# Patient Record
Sex: FEMALE | Race: WHITE | Employment: FULL TIME
[De-identification: names, ages, dates, MRNs, and addresses within clinical notes are randomized per-mention and may not be internally consistent; named-entity substitution may affect disease eponyms.]

---

## 2017-07-08 ENCOUNTER — HEALTH MAINTENANCE LETTER (OUTPATIENT)
Age: 42
End: 2017-07-08

## 2017-09-25 ENCOUNTER — TELEPHONE (OUTPATIENT)
Dept: OBGYN | Facility: CLINIC | Age: 42
End: 2017-09-25

## 2017-09-25 DIAGNOSIS — Z85.3 PERSONAL HISTORY OF MALIGNANT NEOPLASM OF BREAST: Primary | ICD-10-CM

## 2017-09-25 NOTE — TELEPHONE ENCOUNTER
Pt called back said she wants this to check to make sure she does not have any more polyps and she has a history of breast cancer, she said the MD would know why to order the US.    Please advise -    Gabby Dorado  [facility] [place]

## 2017-09-25 NOTE — TELEPHONE ENCOUNTER
Pt calling to get a pelvic ultra sound placed so when she schedules her annual in November she can also do the U/S.    Please advise -    Gabby Dorado  [facility] [place]

## 2017-09-25 NOTE — TELEPHONE ENCOUNTER
Please review and advise.  Do you want to order the pelvic ultrasound?    Thank you.    Krysta Gaxiola   Ob/Gyn Clinic  RN

## 2017-09-27 NOTE — TELEPHONE ENCOUNTER
ULTRASOUND of the pelvis  Please   Russell Tran     Order placed.  Please notify the patient.    Thank you.    Krysta Gaxiola   Ob/Gyn Clinic  [place]

## 2017-09-27 NOTE — TELEPHONE ENCOUNTER
Pt informed - she will schedule.    -Rosamaria MUELLER [facility]  Clinic Station

## 2017-11-01 ENCOUNTER — OFFICE VISIT (OUTPATIENT)
Dept: OBGYN | Facility: CLINIC | Age: 42
End: 2017-11-01
Payer: COMMERCIAL

## 2017-11-01 ENCOUNTER — HOSPITAL ENCOUNTER (OUTPATIENT)
Dept: ULTRASOUND IMAGING | Facility: CLINIC | Age: 42
Discharge: HOME | End: 2017-11-01
Attending: OBSTETRICS & GYNECOLOGY | Admitting: OBSTETRICS & GYNECOLOGY
Payer: COMMERCIAL

## 2017-11-01 VITALS
DIASTOLIC BLOOD PRESSURE: 83 MMHG | HEIGHT: 64 IN | SYSTOLIC BLOOD PRESSURE: 134 MMHG | WEIGHT: 178.4 LBS | HEART RATE: 69 BPM | BODY MASS INDEX: 30.46 KG/M2

## 2017-11-01 DIAGNOSIS — Z85.3 PERSONAL HISTORY OF MALIGNANT NEOPLASM OF BREAST: ICD-10-CM

## 2017-11-01 DIAGNOSIS — Z01.419 ENCOUNTER FOR GYNECOLOGICAL EXAMINATION WITHOUT ABNORMAL FINDING: Primary | ICD-10-CM

## 2017-11-01 DIAGNOSIS — C50.912 MALIGNANT NEOPLASM OF LEFT FEMALE BREAST, UNSPECIFIED ESTROGEN RECEPTOR STATUS, UNSPECIFIED SITE OF BREAST (H): ICD-10-CM

## 2017-11-01 PROCEDURE — 76830 TRANSVAGINAL US NON-OB: CPT

## 2017-11-01 PROCEDURE — G0145 SCR C/V CYTO,THINLAYER,RESCR: HCPCS | Performed by: OBSTETRICS & GYNECOLOGY

## 2017-11-01 PROCEDURE — 99396 PREV VISIT EST AGE 40-64: CPT | Performed by: OBSTETRICS & GYNECOLOGY

## 2017-11-01 PROCEDURE — 87624 HPV HI-RISK TYP POOLED RSLT: CPT | Performed by: OBSTETRICS & GYNECOLOGY

## 2017-11-01 PROCEDURE — 87491 CHLMYD TRACH DNA AMP PROBE: CPT | Performed by: OBSTETRICS & GYNECOLOGY

## 2017-11-01 PROCEDURE — 87591 N.GONORRHOEAE DNA AMP PROB: CPT | Performed by: OBSTETRICS & GYNECOLOGY

## 2017-11-01 NOTE — MR AVS SNAPSHOT
After Visit Summary   11/1/2017    Teresa Chin    MRN: [id number]           Patient Information     Date Of Birth          1975        Visit Information        Provider Department      11/1/2017 3:30 PM Russell Tran MD [facility]        Today's Diagnoses     Encounter for gynecological examination without abnormal finding    -  1    Malignant neoplasm of left female breast, unspecified estrogen receptor status, unspecified site of breast (H)          Care Instructions      Preventive Health Recommendations  Female Ages 40 to 49    Yearly exam:     See your health care provider every year in order to  1. Review health changes.   2. Discuss preventive care.    3. Review your medicines if your doctor prescribed any.      Get a Pap test every three years (unless you have an abnormal result and your provider advises testing more often).      If you get Pap tests with HPV test, you only need to test every 5 years, unless you have an abnormal result. You do not need a Pap test if your uterus was removed (hysterectomy) and you have not had cancer.      You should be tested each year for STDs (sexually transmitted diseases), if you're at risk.       Ask your doctor if you should have a mammogram.      Have a colonoscopy (test for colon cancer) if someone in your family has had colon cancer or polyps before age 50.       Have a cholesterol test every 5 years.       Have a diabetes test (fasting glucose) after age 45. If you are at risk for diabetes, you should have this test every 3 years.    Shots: Get a flu shot each year. Get a tetanus shot every 10 years.     Nutrition:     Eat at least 5 servings of fruits and vegetables each day.    Eat whole-grain bread, whole-wheat pasta and brown rice instead of white grains and rice.    Talk to your provider about Calcium and Vitamin D.     Lifestyle    Exercise at least 150 minutes a week (an average of 30 minutes a day, 5 days a week).  "This will help you control your weight and prevent disease.    Limit alcohol to one drink per day.    No smoking.     Wear sunscreen to prevent skin cancer.    See your dentist every six months for an exam and cleaning.          Follow-ups after your visit        Follow-up notes from your care team     Return in about 1 year (around 2018).      Who to contact     If you have questions or need follow up information about today's clinic visit or your schedule please contact [facility] directly at [phone].  Normal or non-critical lab and imaging results will be communicated to you by e-Rewardshart, letter or phone within 4 business days after the clinic has received the results. If you do not hear from us within 7 days, please contact the clinic through Noquot or phone. If you have a critical or abnormal lab result, we will notify you by phone as soon as possible.  Submit refill requests through Engagio or call your pharmacy and they will forward the refill request to us. Please allow 3 business days for your refill to be completed.          Additional Information About Your Visit        e-RewardsharBiba Information     Engagio lets you send messages to your doctor, view your test results, renew your prescriptions, schedule appointments and more. To sign up, go to [email or web address] . Click on \"Log in\" on the left side of the screen, which will take you to the Welcome page. Then click on \"Sign up Now\" on the right side of the page.     You will be asked to enter the access code listed below, as well as some personal information. Please follow the directions to create your username and password.     Your access code is: [email or web address]  Expires: 2018  4:24 PM     Your access code will  in 90 days. If you need help or a new code, please call your [facility] or [phone].        Care EveryWhere ID     This is your Care EveryWhere ID. This could be used by other organizations to access your " "[place] medical records  [id number]        Your Vitals Were     Pulse Height Last Period BMI (Body Mass Index)          69 5' 3.75\" (1.619 m) 10/26/2017 30.86 kg/m2         Blood Pressure from Last 3 Encounters:   11/01/17 134/83   10/20/15 122/71   06/02/15 120/70    Weight from Last 3 Encounters:   11/01/17 178 lb 6.4 oz (80.9 kg)   10/20/15 173 lb (78.5 kg)   06/02/15 167 lb (75.8 kg)              We Performed the Following     Chlamydia trachomatis PCR     HPV High Risk Types DNA Cervical     Neisseria gonorrhoeae PCR     Pap imaged thin layer screen with HPV - recommended age 30 - 65 years (select HPV order below)          Today's Medication Changes          These changes are accurate as of: 11/1/17 11:59 PM.  If you have any questions, ask your nurse or doctor.               Stop taking these medicines if you haven't already. Please contact your care team if you have questions.     BIOTIN PO   Stopped by:  Russell Tran MD           DAILY MULTI PO   Stopped by:  Russell Tran MD                    Primary Care Provider Office Phone # Fax #    Russell Tran [phone] [address] [address]        Equal Access to Services     JUAN BEAR AH: Hadarmando robbins hadasho Soomaali, waaxda luqadaha, qaybta kaalmada adeegyada, kimani reyna haymargo saldaña . So [facility] [phone].    ATENCIÓN: Si habla español, tiene a headley disposición servicios gratuitos de asistencia lingüística. Llame al [phone].    We comply with applicable federal civil rights laws and [place] laws. We do not discriminate on the basis of race, color, national origin, age, disability, sex, sexual orientation, or gender identity.            Thank you!     Thank you for choosing [facility]  for your care. Our goal is always to provide you with excellent care. Hearing back from our patients is one way we can continue to improve our services. Please take a few " minutes to complete the written survey that you may receive in the mail after your visit with us. Thank you!             Your Updated Medication List - Protect others around you: Learn how to safely use, store and throw away your medicines at www.disposemymeds.org.          This list is accurate as of: 11/1/17 11:59 PM.  Always use your most recent med list.                   Brand Name Dispense Instructions for use Diagnosis    VITAMIN D3 PO      Take 1,000 Units by mouth daily

## 2017-11-01 NOTE — PROGRESS NOTES
SUBJECTIVE:   CC: Teresa Chin is an 42 year old woman who presents for preventive health visit.     Healthy Habits:    Do you get at least three servings of calcium containing foods daily (dairy, green leafy vegetables, etc.)? no    Amount of exercise or daily activities, outside of work: 6 days per week    Problems taking medications regularly No    Medication side effects: No    Have you had an eye exam in the past two years? yes    Do you see a dentist twice per year? no    Do you have sleep apnea, excessive snoring or daytime drowsiness?yes          Go over pelvic ultrasound from today    Today's PHQ-2 Score: PHQ-2 ( 1999 Pfizer) 11/1/2017 12/17/2013   Q1: Little interest or pleasure in doing things 0 0   Q2: Feeling down, depressed or hopeless 0 1   PHQ-2 Score 0 1         Abuse: Current or Past(Physical, Sexual or Emotional)- No  Do you feel safe in your environment - Yes  Social History   Substance Use Topics     Smoking status: Former Smoker     Quit date: 5/2/2005     Smokeless tobacco: Never Used     Alcohol use Yes      Comment: 5-12/drinks weekly- quit with pregnancy     The patient does not drink >3 drinks per day nor >7 drinks per week.    Reviewed orders with patient.  Reviewed health maintenance and updated orders accordingly - Yes  BP Readings from Last 3 Encounters:   11/01/17 134/83   10/20/15 122/71   06/02/15 120/70    Wt Readings from Last 3 Encounters:   11/01/17 178 lb 6.4 oz (80.9 kg)   10/20/15 173 lb (78.5 kg)   06/02/15 167 lb (75.8 kg)                  Patient Active Problem List   Diagnosis     Breast cancer (H)     CARDIOVASCULAR SCREENING; LDL GOAL LESS THAN 160     Personal history of malignant neoplasm of breast     Past Surgical History:   Procedure Laterality Date     DILATION AND CURETTAGE, OPERATIVE HYSTEROSCOPY, COMBINED  12/30/2013    Procedure: COMBINED DILATION AND CURETTAGE, OPERATIVE HYSTEROSCOPY;  Hysteroscopy with Dilation and Curettage;  Surgeon: Russell Tran  MD [place];  Location: [facility] OR     SURGICAL HISTORY OF -   1/2005    Mastectomy w/reconstruction on a later date     SURGICAL HISTORY OF -   1998    Hx Leep procedure     SURGICAL HISTORY OF -   12/05,6/06,2/07    Hx breast reconstruction (Left     SURGICAL HISTORY OF -   12/05    Hx Right breast augmentation     TONSILLECTOMY  as a child       Social History   Substance Use Topics     Smoking status: Former Smoker     Quit date: 5/2/2005     Smokeless tobacco: Never Used     Alcohol use Yes      Comment: 5-12/drinks weekly- quit with pregnancy     Family History   Problem Relation Age of Onset     Hypertension Mother      Cataracts Mother      Alcohol/Drug Father      CANCER Father      CEREBROVASCULAR DISEASE Maternal Grandmother          Current Outpatient Prescriptions   Medication Sig Dispense Refill     Cholecalciferol (VITAMIN D3 PO) Take 1,000 Units by mouth daily       Allergies   Allergen Reactions     Erythromycin Nausea and Vomiting     Vicodin [Hydrocodone-Acetaminophen] Nausea and Vomiting           Alternate mammogram schedule due to breast cancer history mammograms have been negative    Pertinent mammograms are reviewed under the imaging tab.  History of abnormal Pap smear: NO - age 30- 65 PAP every 3 years recommended    Reviewed and updated as needed this visit by clinical staffTobacco  Allergies  Meds  Med Hx  Surg Hx  Fam Hx  Soc Hx        Reviewed and updated as needed this visit by Provider        Past Medical History:   Diagnosis Date     Carcinoma in situ of cervix uteri      Condyloma acuminata      Post - coital bleeding      Tobacco use disorder     Personal history of tobacco use, presenting hazards to health     Unspecified inflammatory disease of uterus 1/2007    s/p IUD placement paragard      Past Surgical History:   Procedure Laterality Date     DILATION AND CURETTAGE, OPERATIVE HYSTEROSCOPY, COMBINED  12/30/2013    Procedure: COMBINED DILATION AND CURETTAGE, OPERATIVE HYSTEROSCOPY;  " Hysteroscopy with Dilation and Curettage;  Surgeon: Russell Tran MD;  Location: [facility] OR     SURGICAL HISTORY OF -   2005    Mastectomy w/reconstruction on a later date     SURGICAL HISTORY OF -       Hx Leep procedure     SURGICAL HISTORY OF -   ,,    Hx breast reconstruction (Left     SURGICAL HISTORY OF -       Hx Right breast augmentation     TONSILLECTOMY  as a child     Obstetric History       T1      L1     SAB0   TAB0   Ectopic0   Multiple0   Live Births1       # Outcome Date GA Lbr Shay/2nd Weight Sex Delivery Anes PTL Lv   1 Term 12 39w6d 15:00 / 03:11 7 lb 6 oz (3.345 kg) F Vag-Spont EPI N ANITRA      Name: Prudence      Apgar1:  9                Apgar5: 9          ROS:  C: NEGATIVE for fever, chills, change in weight  I: NEGATIVE for worrisome rashes, moles or lesions  E: NEGATIVE for vision changes or irritation  ENT: NEGATIVE for ear, mouth and throat problems  R: NEGATIVE for significant cough or SOB  BREAST: NEGATIVE for masses, tenderness or discharge  CV: NEGATIVE for chest pain, palpitations or peripheral edema  GI: NEGATIVE for nausea, abdominal pain, heartburn, or change in bowel habits  : NEGATIVE for unusual urinary or vaginal symptoms. Periods are regular.  M: NEGATIVE for significant arthralgias or myalgia  N: NEGATIVE for weakness, dizziness or paresthesias  P: NEGATIVE for changes in mood or affect    OBJECTIVE:   /83 (BP Location: Right arm, Cuff Size: Adult Regular)  Pulse 69  Ht 5' 3.75\" (1.619 m)  Wt 178 lb 6.4 oz (80.9 kg)  LMP 10/26/2017  BMI 30.86 kg/m2  EXAM:  GENERAL: healthy, alert and no distress  EYES: Eyes grossly normal to inspection, PERRL and conjunctivae and sclerae normal  NECK: no adenopathy, no asymmetry, masses, or scars and thyroid normal to palpation  RESP: lungs clear to auscultation - no rales, rhonchi or wheezes  BREAST: Right- normal without masses, tenderness or nipple discharge, no palpable axillary " "masses or adenopathy and s/p mastectomy Left with reconstruction  CV: regular rate and rhythm, normal S1 S2, no S3 or S4, no murmur, click or rub, no peripheral edema and peripheral pulses strong  ABDOMEN: soft, nontender, no hepatosplenomegaly, no masses and bowel sounds normal   (female): normal female external genitalia, normal urethral meatus, vaginal mucosa pink, moist, well rugated, and normal cervix/adnexa/uterus without masses or discharge  MS: no gross musculoskeletal defects noted, no edema  SKIN: no suspicious lesions or rashes  NEURO: Normal strength and tone, mentation intact and speech normal  PSYCH: mentation appears normal, affect normal/bright  LYMPH: no cervical, supraclavicular, axillary, or inguinal adenopathy    ASSESSMENT/PLAN:   (Z01.419) Encounter for gynecological examination without abnormal finding  (primary encounter diagnosis)  Comment: normal exam  Plan: Pap imaged thin layer screen with HPV -         recommended age 30 - 65 years (select HPV order        below), HPV High Risk Types DNA Cervical,         Chlamydia trachomatis PCR, Neisseria         gonorrhoeae PCR            (C50.912) Malignant neoplasm of left female breast, unspecified estrogen receptor status, unspecified site of breast (H)  Comment: S/p left mastectomy and reconstruction  Plan: continue oncology recommendation        COUNSELING:   Reviewed preventive health counseling, as reflected in patient instructions       Regular exercise       Healthy diet/nutrition         reports that she quit smoking about 12 years ago. She has never used smokeless tobacco.    Estimated body mass index is 30.86 kg/(m^2) as calculated from the following:    Height as of this encounter: 5' 3.75\" (1.619 m).    Weight as of this encounter: 178 lb 6.4 oz (80.9 kg).         Counseling Resources:  ATP IV Guidelines  Pooled Cohorts Equation Calculator  Breast Cancer Risk Calculator  FRAX Risk Assessment  ICSI Preventive Guidelines  Dietary " Guidelines for Americans, 2010  USDA's MyPlate  ASA Prophylaxis  Lung CA Screening    Russell Tran MD  [facility]

## 2017-11-01 NOTE — LETTER
November 12, 2017    Teresa Chin  [address]   [place] [address]    Dear Teresa,  We are happy to inform you that your PAP smear result from 11/1/17 is normal.  We are now able to do a follow up test on PAP smears. The DNA test is for HPV (Human Papilloma Virus). Cervical cancer is closely linked with certain types of HPV. Your result showed no evidence of high risk HPV.  Therefore we recommend you return in 1 year for your next pap smear and HPV test.  You will still need to return to the clinic every year for an annual exam and other preventive tests.  Please contact the clinic at [phone] with any questions.  Sincerely,    Russell Tran MD/keya

## 2017-11-01 NOTE — Clinical Note
annual mammograms.  A lay language report of this examination will be provided to the patient.    MAMMOGRAM ASSESSMENT:  ACR 2 Benign Result Narrative XR MAMMO JASMINE UNI IMPLT SCREEN RIGHT [[id number]]  CLINICAL HISTORY:  This is an asymptomatic 41 y.o. patient.  INDICATION FOR EXAM: Mammogram Screening.  TECHNIQUE: CC & MLO views were obtained. Implant displacement views were  obtained. This digital study was evaluated with the assistance of  Computer-Aided Detection. Breast Tomosynthesis was used in interpretation.   COMPARISON FILMS: Yes 3/9/15 [facility] 2/2/14 [facility]  FINDINGS:  Mammographically, the breast tissue has scattered  fibroglandular densities.  No suspicious masses or microcalcifications.   Implant(s) within right breast and Benign appearing calcifications within  right breast.  Status

## 2017-11-01 NOTE — NURSING NOTE
"Initial /83 (BP Location: Right arm, Cuff Size: Adult Regular)  Pulse 69  Ht 5' 3.75\" (1.619 m)  Wt 178 lb 6.4 oz (80.9 kg)  LMP 10/26/2017  BMI 30.86 kg/m2 Estimated body mass index is 30.86 kg/(m^2) as calculated from the following:    Height as of this encounter: 5' 3.75\" (1.619 m).    Weight as of this encounter: 178 lb 6.4 oz (80.9 kg). .      "

## 2017-11-02 LAB
C TRACH DNA SPEC QL NAA+PROBE: NEGATIVE
N GONORRHOEA DNA SPEC QL NAA+PROBE: NEGATIVE
SPECIMEN SOURCE: NORMAL
SPECIMEN SOURCE: NORMAL

## 2017-11-03 LAB
COPATH REPORT: NORMAL
PAP: NORMAL

## 2017-11-07 LAB
FINAL DIAGNOSIS: NORMAL
HPV HR 12 DNA CVX QL NAA+PROBE: NEGATIVE
HPV16 DNA SPEC QL NAA+PROBE: NEGATIVE
HPV18 DNA SPEC QL NAA+PROBE: NEGATIVE
SPECIMEN DESCRIPTION: NORMAL

## 2021-12-01 ENCOUNTER — TRANSCRIBE ORDERS (OUTPATIENT)
Dept: OTHER | Age: 46
End: 2021-12-01
Payer: COMMERCIAL

## 2021-12-01 DIAGNOSIS — R87.619 ABNORMAL PAP SMEAR OF CERVIX: Primary | ICD-10-CM

## 2021-12-01 DIAGNOSIS — D06.9 HIGH GRADE SQUAMOUS INTRAEPITHELIAL LESION (HGSIL), GRADE 3 CIN, ON BIOPSY OF CERVIX: ICD-10-CM

## 2021-12-15 ENCOUNTER — TELEPHONE (OUTPATIENT)
Dept: OBGYN | Facility: CLINIC | Age: 46
End: 2021-12-15

## 2021-12-15 ENCOUNTER — OFFICE VISIT (OUTPATIENT)
Dept: OBGYN | Facility: CLINIC | Age: 46
End: 2021-12-15
Attending: PHYSICIAN ASSISTANT
Payer: COMMERCIAL

## 2021-12-15 VITALS
SYSTOLIC BLOOD PRESSURE: 136 MMHG | HEART RATE: 81 BPM | DIASTOLIC BLOOD PRESSURE: 91 MMHG | TEMPERATURE: 97.3 F | BODY MASS INDEX: 30.52 KG/M2 | WEIGHT: 183.2 LBS | RESPIRATION RATE: 20 BRPM | HEIGHT: 65 IN

## 2021-12-15 DIAGNOSIS — R87.612 PAPANICOLAOU SMEAR OF CERVIX WITH LOW GRADE SQUAMOUS INTRAEPITHELIAL LESION (LGSIL): Primary | ICD-10-CM

## 2021-12-15 PROCEDURE — 99203 OFFICE O/P NEW LOW 30 MIN: CPT | Performed by: OBSTETRICS & GYNECOLOGY

## 2021-12-15 RX ORDER — LACTOBACILLUS RHAMNOSUS GG 10B CELL
1 CAPSULE ORAL 2 TIMES DAILY
COMMUNITY

## 2021-12-15 ASSESSMENT — MIFFLIN-ST. JEOR: SCORE: 1472.9

## 2021-12-15 NOTE — TELEPHONE ENCOUNTER
"[id number]  Teresa Chin    You are now scheduled for surgery at [facility].  Below are the details for your surgery.  Please read the \"Preparing for Your Surgery\" instructions and let us know if you have any questions.    Type of surgery: Laparoscopic total hysterectomy, Bilateral salpingectomy, with sparing of ovaries and cystoscopy (Bilateral)     Surgeon:  Russell Tran MD  Location of surgery: [facility] OR    Date of surgery: 1-24-22    Time: 7:30am   Arrival Time: 6:00am    Time can change, to be confirmed a couple of days prior by pre-op surgery nurse.    Pre-Op Appt Date: Patient to schedule with a PCP or Family Practice Provider within 30 days to the surgery.  Post-Op Appt Date: To be determined by provider     COVID test 2-4 days prior at [facility]  Date 1-22-22  Time 3pm    Packet sent out: Yes  Pre-cert/Authorization completed:  TBD by Financial Securing Office.   MA Sterilization/Hysterectomy Acknowledgment Consent signed: Yes 12-15-21    [facility]  [phone]    Fax: [phone]  Same Day Surgery [phone]  Fax: [phone]  B[facility] Center [phone]    "

## 2021-12-15 NOTE — PROGRESS NOTES
CC:  Follow up  from herself for recurrent dysplasia and HR HPV (non 16/18)  HPI:  Teresa Chin is a 46 year old female is a   .  Patient's last menstrual period was 11/17/2021 (approximate).  Menses are regular q 28-30 days, lasting 6 days. Heavy and clotting for the first 2 days  She has no desire for future childbearing  She has had  HGSIL and undergone a LEEP in the remote past.  She is a Breast cancer survivor.  She is not a candidate for HRT  She has no interest in undergoing colposcoscopy  She would like information on hysterectomy  Patients records are available and reviewed at today's visit.    Past GYN history:  HPV       Last PAP smear:  LSIL      Past Medical History:   Diagnosis Date     Carcinoma in situ of cervix uteri      Condyloma acuminata      Post - coital bleeding      Tobacco use disorder     Personal history of tobacco use, presenting hazards to health     Unspecified inflammatory disease of uterus 1/2007    s/p IUD placement paragard       Past Surgical History:   Procedure Laterality Date     DILATION AND CURETTAGE, OPERATIVE HYSTEROSCOPY, COMBINED  12/30/2013    Procedure: COMBINED DILATION AND CURETTAGE, OPERATIVE HYSTEROSCOPY;  Hysteroscopy with Dilation and Curettage;  Surgeon: Russell Tran MD;  Location: [facility] OR     SURGICAL HISTORY OF -   1/2005    Mastectomy w/reconstruction on a later date     SURGICAL HISTORY OF -   1998    Hx Leep procedure     SURGICAL HISTORY OF -   12/05,6/06,2/07    Hx breast reconstruction (Left     SURGICAL HISTORY OF -   12/05    Hx Right breast augmentation     TONSILLECTOMY  as a child       Family History   Problem Relation Age of Onset     Hypertension Mother      Cataracts Mother      Alcohol/Drug Father      Cancer Father      Cerebrovascular Disease Maternal Grandmother        Allergies: Erythromycin and Vicodin [hydrocodone-acetaminophen]    Current Outpatient Medications   Medication Sig Dispense Refill     Calcium-Magnesium-Zinc  "333-133-5 MG TABS per tablet Take 1 tablet by mouth daily       lactobacillus rhamnosus, GG, (CULTURELL) capsule Take 1 capsule by mouth 2 times daily       Multiple Vitamins-Minerals (DAILY MULTI PO)        vitamin B-Complex Take 1 tablet by mouth daily       Cholecalciferol (VITAMIN D3 PO) Take 1,000 Units by mouth daily         ROS:  C: NEGATIVE for fever, chills, change in weight  I: NEGATIVE for worrisome rashes, moles or lesions  E: NEGATIVE for vision changes or irritation  E/M: NEGATIVE for ear, mouth and throat problems  R: NEGATIVE for significant cough or SOB  CV: NEGATIVE for chest pain, palpitations or peripheral edema  GI: NEGATIVE for nausea, abdominal pain, heartburn, or change in bowel habits  : NEGATIVE for frequency, dysuria, hematuria, vaginal discharge  M: NEGATIVE for significant arthralgias or myalgia  N: NEGATIVE for weakness, dizziness or paresthesias  E: NEGATIVE for temperature intolerance, skin/hair changes  P: NEGATIVE for changes in mood or affect    EXAM:  Blood pressure (!) 136/91, pulse 81, temperature 97.3  F (36.3  C), resp. rate 20, height 1.645 m (5' 4.75\"), weight 83.1 kg (183 lb 3.2 oz), last menstrual period 11/17/2021.   BMI= Body mass index is 30.72 kg/m .  General - pleasant female in no acute distress.    No exam today       ASSESSMENT/PLAN:  (R87.612) Papanicolaou smear of cervix with low grade squamous intraepithelial lesion (LGSIL)  (primary encounter diagnosis)  Comment: previous LEEP for higher grade dysplasia  Recurrence of dysplasia  Plan: Case Request: Laparoscopic total hysterectomy,         Bilateral salpingectomy, with sparing of         ovaries and cystoscopy            I described the procedures as indicated above. The types of anesthesia were reviewed. The pre and post-op expectations were noted. We discussed the risks and benefits of the above procedures. The risk of bleeding, infection and damage to other organs was reviewed. The possibility of much " larger incision(s) was discussed.  No guarantees were made.  She did express understanding and desires to proceed with surgery.          Letter will be sent to the referring provider.    Russell Tran MD

## 2021-12-16 DIAGNOSIS — Z11.59 ENCOUNTER FOR SCREENING FOR OTHER VIRAL DISEASES: ICD-10-CM

## 2021-12-16 NOTE — TELEPHONE ENCOUNTER
Patient called and wanted to reschedule to a sooner date, she has been rescheduled to 1-3-2021 10:00 arrival time and her Covid appointment has been changed to 12-30-31 at 2:30pm. She stated she will have her pre op done at her primary clinic.      Angélica Castillo   [facility] [place]   [facility]  [phone]

## 2021-12-31 ENCOUNTER — ANESTHESIA EVENT (OUTPATIENT)
Dept: SURGERY | Facility: CLINIC | Age: 46
End: 2021-12-31
Payer: COMMERCIAL

## 2022-01-03 ENCOUNTER — HOSPITAL ENCOUNTER (OUTPATIENT)
Facility: CLINIC | Age: 47
Discharge: HOME | End: 2022-01-03
Attending: OBSTETRICS & GYNECOLOGY | Admitting: OBSTETRICS & GYNECOLOGY
Payer: COMMERCIAL

## 2022-01-03 ENCOUNTER — ANESTHESIA (OUTPATIENT)
Dept: SURGERY | Facility: CLINIC | Age: 47
End: 2022-01-03
Payer: COMMERCIAL

## 2022-01-03 VITALS
TEMPERATURE: 97.7 F | HEART RATE: 93 BPM | WEIGHT: 185 LBS | DIASTOLIC BLOOD PRESSURE: 80 MMHG | HEIGHT: 64 IN | BODY MASS INDEX: 31.58 KG/M2 | SYSTOLIC BLOOD PRESSURE: 140 MMHG | OXYGEN SATURATION: 95 % | RESPIRATION RATE: 18 BRPM

## 2022-01-03 DIAGNOSIS — Z90.710 S/P LAPAROSCOPIC HYSTERECTOMY: Primary | ICD-10-CM

## 2022-01-03 LAB — HCG UR QL: NEGATIVE

## 2022-01-03 PROCEDURE — 250N000011 HC RX IP 250 OP 636: Performed by: NURSE ANESTHETIST, CERTIFIED REGISTERED

## 2022-01-03 PROCEDURE — 250N000013 HC RX MED GY IP 250 OP 250 PS 637: Performed by: NURSE ANESTHETIST, CERTIFIED REGISTERED

## 2022-01-03 PROCEDURE — 250N000009 HC RX 250: Performed by: NURSE ANESTHETIST, CERTIFIED REGISTERED

## 2022-01-03 PROCEDURE — 999N000141 HC STATISTIC PRE-PROCEDURE NURSING ASSESSMENT: Performed by: OBSTETRICS & GYNECOLOGY

## 2022-01-03 PROCEDURE — 88307 TISSUE EXAM BY PATHOLOGIST: CPT | Mod: TC | Performed by: OBSTETRICS & GYNECOLOGY

## 2022-01-03 PROCEDURE — 271N000001 HC OR GENERAL SUPPLY NON-STERILE: Performed by: OBSTETRICS & GYNECOLOGY

## 2022-01-03 PROCEDURE — 272N000001 HC OR GENERAL SUPPLY STERILE: Performed by: OBSTETRICS & GYNECOLOGY

## 2022-01-03 PROCEDURE — 81025 URINE PREGNANCY TEST: CPT | Performed by: OBSTETRICS & GYNECOLOGY

## 2022-01-03 PROCEDURE — 370N000017 HC ANESTHESIA TECHNICAL FEE, PER MIN: Performed by: OBSTETRICS & GYNECOLOGY

## 2022-01-03 PROCEDURE — 258N000003 HC RX IP 258 OP 636: Performed by: NURSE ANESTHETIST, CERTIFIED REGISTERED

## 2022-01-03 PROCEDURE — 58571 TLH W/T/O 250 G OR LESS: CPT | Mod: AS | Performed by: PHYSICIAN ASSISTANT

## 2022-01-03 PROCEDURE — 710N000012 HC RECOVERY PHASE 2, PER MINUTE: Performed by: OBSTETRICS & GYNECOLOGY

## 2022-01-03 PROCEDURE — 250N000009 HC RX 250: Performed by: OBSTETRICS & GYNECOLOGY

## 2022-01-03 PROCEDURE — 360N000077 HC SURGERY LEVEL 4, PER MIN: Performed by: OBSTETRICS & GYNECOLOGY

## 2022-01-03 PROCEDURE — 250N000011 HC RX IP 250 OP 636: Performed by: OBSTETRICS & GYNECOLOGY

## 2022-01-03 PROCEDURE — 250N000013 HC RX MED GY IP 250 OP 250 PS 637: Performed by: OBSTETRICS & GYNECOLOGY

## 2022-01-03 PROCEDURE — 58571 TLH W/T/O 250 G OR LESS: CPT | Performed by: OBSTETRICS & GYNECOLOGY

## 2022-01-03 PROCEDURE — 710N000010 HC RECOVERY PHASE 1, LEVEL 2, PER MIN: Performed by: OBSTETRICS & GYNECOLOGY

## 2022-01-03 PROCEDURE — 250N000025 HC SEVOFLURANE, PER MIN: Performed by: OBSTETRICS & GYNECOLOGY

## 2022-01-03 RX ORDER — BUPIVACAINE HYDROCHLORIDE 2.5 MG/ML
INJECTION, SOLUTION INFILTRATION; PERINEURAL PRN
Status: DISCONTINUED | OUTPATIENT
Start: 2022-01-03 | End: 2022-01-03 | Stop reason: HOSPADM

## 2022-01-03 RX ORDER — LIDOCAINE HYDROCHLORIDE 10 MG/ML
INJECTION, SOLUTION INFILTRATION; PERINEURAL PRN
Status: DISCONTINUED | OUTPATIENT
Start: 2022-01-03 | End: 2022-01-03

## 2022-01-03 RX ORDER — HYDROMORPHONE HCL IN WATER/PF 6 MG/30 ML
0.4 PATIENT CONTROLLED ANALGESIA SYRINGE INTRAVENOUS EVERY 5 MIN PRN
Status: DISCONTINUED | OUTPATIENT
Start: 2022-01-03 | End: 2022-01-03 | Stop reason: HOSPADM

## 2022-01-03 RX ORDER — LIDOCAINE 40 MG/G
CREAM TOPICAL
Status: DISCONTINUED | OUTPATIENT
Start: 2022-01-03 | End: 2022-01-03 | Stop reason: HOSPADM

## 2022-01-03 RX ORDER — KETOROLAC TROMETHAMINE 30 MG/ML
30 INJECTION, SOLUTION INTRAMUSCULAR; INTRAVENOUS ONCE
Status: COMPLETED | OUTPATIENT
Start: 2022-01-03 | End: 2022-01-03

## 2022-01-03 RX ORDER — ONDANSETRON 2 MG/ML
4 INJECTION INTRAMUSCULAR; INTRAVENOUS EVERY 30 MIN PRN
Status: DISCONTINUED | OUTPATIENT
Start: 2022-01-03 | End: 2022-01-03 | Stop reason: HOSPADM

## 2022-01-03 RX ORDER — ACETAMINOPHEN 325 MG/1
975 TABLET ORAL ONCE
Status: COMPLETED | OUTPATIENT
Start: 2022-01-03 | End: 2022-01-03

## 2022-01-03 RX ORDER — OXYCODONE HYDROCHLORIDE 5 MG/1
10 TABLET ORAL EVERY 4 HOURS PRN
Status: DISCONTINUED | OUTPATIENT
Start: 2022-01-03 | End: 2022-01-03

## 2022-01-03 RX ORDER — PROPOFOL 10 MG/ML
INJECTION, EMULSION INTRAVENOUS CONTINUOUS PRN
Status: DISCONTINUED | OUTPATIENT
Start: 2022-01-03 | End: 2022-01-03

## 2022-01-03 RX ORDER — HYDROXYZINE HYDROCHLORIDE 25 MG/1
25 TABLET, FILM COATED ORAL EVERY 6 HOURS PRN
Status: DISCONTINUED | OUTPATIENT
Start: 2022-01-03 | End: 2022-01-03 | Stop reason: HOSPADM

## 2022-01-03 RX ORDER — MAGNESIUM SULFATE HEPTAHYDRATE 40 MG/ML
2 INJECTION, SOLUTION INTRAVENOUS ONCE
Status: COMPLETED | OUTPATIENT
Start: 2022-01-03 | End: 2022-01-03

## 2022-01-03 RX ORDER — SODIUM CHLORIDE, SODIUM LACTATE, POTASSIUM CHLORIDE, CALCIUM CHLORIDE 600; 310; 30; 20 MG/100ML; MG/100ML; MG/100ML; MG/100ML
INJECTION, SOLUTION INTRAVENOUS CONTINUOUS
Status: DISCONTINUED | OUTPATIENT
Start: 2022-01-03 | End: 2022-01-03 | Stop reason: HOSPADM

## 2022-01-03 RX ORDER — BUPIVACAINE HYDROCHLORIDE AND EPINEPHRINE 5; 5 MG/ML; UG/ML
INJECTION, SOLUTION PERINEURAL PRN
Status: DISCONTINUED | OUTPATIENT
Start: 2022-01-03 | End: 2022-01-03 | Stop reason: HOSPADM

## 2022-01-03 RX ORDER — IBUPROFEN 200 MG
800 TABLET ORAL ONCE
Status: DISCONTINUED | OUTPATIENT
Start: 2022-01-03 | End: 2022-01-03 | Stop reason: HOSPADM

## 2022-01-03 RX ORDER — KETAMINE HYDROCHLORIDE 10 MG/ML
INJECTION, SOLUTION INTRAMUSCULAR; INTRAVENOUS PRN
Status: DISCONTINUED | OUTPATIENT
Start: 2022-01-03 | End: 2022-01-03

## 2022-01-03 RX ORDER — ONDANSETRON 4 MG/1
4 TABLET, ORALLY DISINTEGRATING ORAL EVERY 30 MIN PRN
Status: DISCONTINUED | OUTPATIENT
Start: 2022-01-03 | End: 2022-01-03 | Stop reason: HOSPADM

## 2022-01-03 RX ORDER — DIMENHYDRINATE 50 MG/ML
25 INJECTION, SOLUTION INTRAMUSCULAR; INTRAVENOUS
Status: COMPLETED | OUTPATIENT
Start: 2022-01-03 | End: 2022-01-03

## 2022-01-03 RX ORDER — OXYCODONE HYDROCHLORIDE 5 MG/1
5-10 TABLET ORAL EVERY 4 HOURS PRN
Qty: 20 TABLET | Refills: 0 | Status: SHIPPED | OUTPATIENT
Start: 2022-01-03

## 2022-01-03 RX ORDER — IBUPROFEN 800 MG/1
800 TABLET, FILM COATED ORAL EVERY 6 HOURS PRN
Qty: 30 TABLET | Refills: 0 | Status: SHIPPED | OUTPATIENT
Start: 2022-01-03

## 2022-01-03 RX ORDER — PROPOFOL 10 MG/ML
INJECTION, EMULSION INTRAVENOUS PRN
Status: DISCONTINUED | OUTPATIENT
Start: 2022-01-03 | End: 2022-01-03

## 2022-01-03 RX ORDER — ACETAMINOPHEN 325 MG/1
975 TABLET ORAL EVERY 6 HOURS PRN
Qty: 50 TABLET | Refills: 0 | COMMUNITY
Start: 2022-01-03

## 2022-01-03 RX ORDER — OXYCODONE HCL 10 MG/1
10 TABLET, FILM COATED, EXTENDED RELEASE ORAL ONCE
Status: COMPLETED | OUTPATIENT
Start: 2022-01-03 | End: 2022-01-03

## 2022-01-03 RX ORDER — HYDROXYZINE HYDROCHLORIDE 50 MG/1
50 TABLET, FILM COATED ORAL EVERY 6 HOURS PRN
Status: DISCONTINUED | OUTPATIENT
Start: 2022-01-03 | End: 2022-01-03 | Stop reason: HOSPADM

## 2022-01-03 RX ORDER — ACETAMINOPHEN 325 MG/1
975 TABLET ORAL ONCE
Status: DISCONTINUED | OUTPATIENT
Start: 2022-01-03 | End: 2022-01-03

## 2022-01-03 RX ORDER — OXYCODONE HYDROCHLORIDE 5 MG/1
5 TABLET ORAL
Status: DISCONTINUED | OUTPATIENT
Start: 2022-01-03 | End: 2022-01-03 | Stop reason: HOSPADM

## 2022-01-03 RX ORDER — ONDANSETRON 2 MG/ML
INJECTION INTRAMUSCULAR; INTRAVENOUS PRN
Status: DISCONTINUED | OUTPATIENT
Start: 2022-01-03 | End: 2022-01-03

## 2022-01-03 RX ORDER — FENTANYL CITRATE 50 UG/ML
INJECTION, SOLUTION INTRAMUSCULAR; INTRAVENOUS PRN
Status: DISCONTINUED | OUTPATIENT
Start: 2022-01-03 | End: 2022-01-03

## 2022-01-03 RX ORDER — PHENAZOPYRIDINE HYDROCHLORIDE 200 MG/1
200 TABLET, FILM COATED ORAL ONCE
Status: COMPLETED | OUTPATIENT
Start: 2022-01-03 | End: 2022-01-03

## 2022-01-03 RX ORDER — CEFAZOLIN SODIUM 2 G/100ML
2 INJECTION, SOLUTION INTRAVENOUS SEE ADMIN INSTRUCTIONS
Status: DISCONTINUED | OUTPATIENT
Start: 2022-01-03 | End: 2022-01-03 | Stop reason: HOSPADM

## 2022-01-03 RX ORDER — ACETAMINOPHEN 325 MG/1
975 TABLET ORAL ONCE
Status: DISCONTINUED | OUTPATIENT
Start: 2022-01-03 | End: 2022-01-03 | Stop reason: HOSPADM

## 2022-01-03 RX ORDER — DEXAMETHASONE SODIUM PHOSPHATE 4 MG/ML
INJECTION, SOLUTION INTRA-ARTICULAR; INTRALESIONAL; INTRAMUSCULAR; INTRAVENOUS; SOFT TISSUE PRN
Status: DISCONTINUED | OUTPATIENT
Start: 2022-01-03 | End: 2022-01-03

## 2022-01-03 RX ORDER — CEFAZOLIN SODIUM 2 G/100ML
2 INJECTION, SOLUTION INTRAVENOUS
Status: COMPLETED | OUTPATIENT
Start: 2022-01-03 | End: 2022-01-03

## 2022-01-03 RX ORDER — MEPERIDINE HYDROCHLORIDE 25 MG/ML
12.5 INJECTION INTRAMUSCULAR; INTRAVENOUS; SUBCUTANEOUS
Status: DISCONTINUED | OUTPATIENT
Start: 2022-01-03 | End: 2022-01-03 | Stop reason: HOSPADM

## 2022-01-03 RX ORDER — FENTANYL CITRATE 50 UG/ML
50 INJECTION, SOLUTION INTRAMUSCULAR; INTRAVENOUS EVERY 5 MIN PRN
Status: DISCONTINUED | OUTPATIENT
Start: 2022-01-03 | End: 2022-01-03 | Stop reason: HOSPADM

## 2022-01-03 RX ADMIN — HYDROMORPHONE HYDROCHLORIDE 1 MG: 1 INJECTION, SOLUTION INTRAMUSCULAR; INTRAVENOUS; SUBCUTANEOUS at 13:34

## 2022-01-03 RX ADMIN — CEFAZOLIN SODIUM 2 G: 2 INJECTION, SOLUTION INTRAVENOUS at 11:39

## 2022-01-03 RX ADMIN — SODIUM CHLORIDE, POTASSIUM CHLORIDE, SODIUM LACTATE AND CALCIUM CHLORIDE 1000 ML: 600; 310; 30; 20 INJECTION, SOLUTION INTRAVENOUS at 10:44

## 2022-01-03 RX ADMIN — DEXAMETHASONE SODIUM PHOSPHATE 8 MG: 4 INJECTION, SOLUTION INTRA-ARTICULAR; INTRALESIONAL; INTRAMUSCULAR; INTRAVENOUS; SOFT TISSUE at 11:44

## 2022-01-03 RX ADMIN — KETAMINE HYDROCHLORIDE 30 MG: 10 INJECTION INTRAMUSCULAR; INTRAVENOUS at 12:09

## 2022-01-03 RX ADMIN — PHENAZOPYRIDINE HYDROCHLORIDE 200 MG: 200 TABLET ORAL at 10:28

## 2022-01-03 RX ADMIN — ONDANSETRON 4 MG: 2 INJECTION INTRAMUSCULAR; INTRAVENOUS at 12:22

## 2022-01-03 RX ADMIN — MAGNESIUM SULFATE HEPTAHYDRATE 2 G: 40 INJECTION, SOLUTION INTRAVENOUS at 11:55

## 2022-01-03 RX ADMIN — PROPOFOL 200 MG: 10 INJECTION, EMULSION INTRAVENOUS at 11:44

## 2022-01-03 RX ADMIN — ROCURONIUM BROMIDE 50 MG: 50 INJECTION, SOLUTION INTRAVENOUS at 11:44

## 2022-01-03 RX ADMIN — MIDAZOLAM 2 MG: 1 INJECTION INTRAMUSCULAR; INTRAVENOUS at 11:39

## 2022-01-03 RX ADMIN — LIDOCAINE HYDROCHLORIDE 50 MG: 10 INJECTION, SOLUTION INFILTRATION; PERINEURAL at 11:44

## 2022-01-03 RX ADMIN — SODIUM CHLORIDE, POTASSIUM CHLORIDE, SODIUM LACTATE AND CALCIUM CHLORIDE: 600; 310; 30; 20 INJECTION, SOLUTION INTRAVENOUS at 12:22

## 2022-01-03 RX ADMIN — KETAMINE HYDROCHLORIDE 20 MG: 10 INJECTION INTRAMUSCULAR; INTRAVENOUS at 12:17

## 2022-01-03 RX ADMIN — OXYCODONE HYDROCHLORIDE 10 MG: 10 TABLET, FILM COATED, EXTENDED RELEASE ORAL at 10:28

## 2022-01-03 RX ADMIN — FENTANYL CITRATE 150 MCG: 50 INJECTION, SOLUTION INTRAMUSCULAR; INTRAVENOUS at 11:44

## 2022-01-03 RX ADMIN — PROPOFOL 100 MCG/KG/MIN: 10 INJECTION, EMULSION INTRAVENOUS at 11:44

## 2022-01-03 RX ADMIN — DIMENHYDRINATE 25 MG: 50 INJECTION, SOLUTION INTRAMUSCULAR; INTRAVENOUS at 16:06

## 2022-01-03 RX ADMIN — ACETAMINOPHEN 975 MG: 325 TABLET, FILM COATED ORAL at 10:28

## 2022-01-03 RX ADMIN — FENTANYL CITRATE 100 MCG: 50 INJECTION, SOLUTION INTRAMUSCULAR; INTRAVENOUS at 12:03

## 2022-01-03 RX ADMIN — KETOROLAC TROMETHAMINE 30 MG: 30 INJECTION, SOLUTION INTRAMUSCULAR at 10:46

## 2022-01-03 RX ADMIN — SUGAMMADEX 200 MG: 100 INJECTION, SOLUTION INTRAVENOUS at 13:07

## 2022-01-03 RX ADMIN — PROCHLORPERAZINE EDISYLATE 5 MG: 5 INJECTION, SOLUTION INTRAMUSCULAR; INTRAVENOUS at 16:40

## 2022-01-03 ASSESSMENT — MIFFLIN-ST. JEOR: SCORE: 1469.15

## 2022-01-03 ASSESSMENT — LIFESTYLE VARIABLES: TOBACCO_USE: 1

## 2022-01-03 NOTE — ANESTHESIA POSTPROCEDURE EVALUATION
Patient: Teresa Chin    Procedure: Procedure(s):  Laparoscopic total hysterectomy, Bilateral salpingectomy, with sparing of ovaries and cystoscopy       Diagnosis:Papanicolaou smear of cervix with low grade squamous intraepithelial lesion (LGSIL) [R87.612]  Diagnosis Additional Information: No value filed.    Anesthesia Type:  General    Note:  Disposition: Outpatient   Postop Pain Control: Uneventful            Sign Out: Well controlled pain   PONV: No   Neuro/Psych: Uneventful            Sign Out: Acceptable/Baseline neuro status   Airway/Respiratory: Uneventful            Sign Out: Acceptable/Baseline resp. status   CV/Hemodynamics: Uneventful            Sign Out: Acceptable CV status; No obvious hypovolemia; No obvious fluid overload   Other NRE: NONE   DID A NON-ROUTINE EVENT OCCUR? No           Last vitals:  Vitals Value Taken Time   /88 01/03/22 1445   Temp 36.3  C (97.4  F) 01/03/22 1355   Pulse 63 01/03/22 1446   Resp 6 01/03/22 1446   SpO2 100 % 01/03/22 1446   Vitals shown include unvalidated device data.    Electronically Signed By: BAM Godinez CRNA  January 3, 2022  2:47 PM

## 2022-01-03 NOTE — DISCHARGE INSTRUCTIONS
Same Day Surgery Discharge Instructions  Special Precautions After Surgery - Adult    1. It is not unusual to feel lightheaded or faint, up to 24 hours after surgery or while taking pain medication.  If you have these symptoms; sit for a few minutes before standing and have someone assist you when getting up.  2. You should rest and relax for the next 24 hours and must have someone stay with you for at least 24 hours after your discharge.  3. DO NOT DRIVE any vehicle or operate mechanical equipment for 24 hours following the end of your surgery.  DO NOT DRIVE while taking narcotic pain medications that have been prescribed by your physician.  If you had a limb operated on, you must be able to use it fully to drive.  4. DO NOT drink alcoholic beverages for 24 hours following surgery or while taking prescription pain medication.  5. Drink clear liquids (apple juice, ginger ale, broth, 7-Up, etc.).  Progress to your regular diet as you feel able.  6. Any questions call your physician and do not make important decisions for 24 hours.    _______________________________________________________________________  IMPORTANT NUMBERS:    [facility] Main Number:  [phone], [phone]  Pharmacy:  [phone]  Same Day Surgery:  [phone], Monday - Friday until 8:30 p.m.  Urgent Care:  [phone]  OB Clinic:  [phone]   Nurse Advice Line: [phone]

## 2022-01-03 NOTE — ANESTHESIA PREPROCEDURE EVALUATION
Anesthesia Pre-Procedure Evaluation    Patient: Teresa Chin   MRN: [id number] : 1975        Preoperative Diagnosis: Papanicolaou smear of cervix with low grade squamous intraepithelial lesion (LGSIL) [R87.612]    Procedure : Procedure(s):  Laparoscopic total hysterectomy, Bilateral salpingectomy, with sparing of ovaries and cystoscopy          Past Medical History:   Diagnosis Date     Carcinoma in situ of cervix uteri      Condyloma acuminata      Post - coital bleeding      Tobacco use disorder     Personal history of tobacco use, presenting hazards to health     Unspecified inflammatory disease of uterus 2007    s/p IUD placement paragard      Past Surgical History:   Procedure Laterality Date     DILATION AND CURETTAGE, OPERATIVE HYSTEROSCOPY, COMBINED  2013    Procedure: COMBINED DILATION AND CURETTAGE, OPERATIVE HYSTEROSCOPY;  Hysteroscopy with Dilation and Curettage;  Surgeon: Russell Tran MD;  Location: [facility] OR     SURGICAL HISTORY OF -   2005    Mastectomy w/reconstruction on a later date     SURGICAL HISTORY OF -       Hx Leep procedure     SURGICAL HISTORY OF -   ,,    Hx breast reconstruction (Left     SURGICAL HISTORY OF -       Hx Right breast augmentation     TONSILLECTOMY  as a child      Allergies   Allergen Reactions     Erythromycin Nausea and Vomiting     Vicodin [Hydrocodone-Acetaminophen] Nausea and Vomiting      Social History     Tobacco Use     Smoking status: Former Smoker     Quit date: 2005     Years since quittin.6     Smokeless tobacco: Never Used   Substance Use Topics     Alcohol use: Yes     Comment: 5-12/drinks weekly- quit with pregnancy      Wt Readings from Last 1 Encounters:   22 83.9 kg (185 lb)        Anesthesia Evaluation   Pt has had prior anesthetic. Type: MAC and General.    No history of anesthetic complications       ROS/MED HX  ENT/Pulmonary:     (+) tobacco use, Past use,     Neurologic:  - neg neurologic  ROS     Cardiovascular:  - neg cardiovascular ROS     METS/Exercise Tolerance:     Hematologic:  - neg hematologic  ROS     Musculoskeletal:  - neg musculoskeletal ROS     GI/Hepatic:     (+) GERD,     Renal/Genitourinary:  - neg Renal ROS     Endo:     (+) Obesity,     Psychiatric/Substance Use:     (+) psychiatric history anxiety and depression     Infectious Disease:  - neg infectious disease ROS     Malignancy:   (+) Malignancy, History of Breast and Other.Breast CA Remission status post Surgery.  Other CA cervical Active status post Surgery.    Other:  - neg other ROS          Physical Exam    Airway  airway exam normal           Respiratory Devices and Support         Dental  no notable dental history         Cardiovascular   cardiovascular exam normal          Pulmonary   pulmonary exam normal                OUTSIDE LABS:  CBC:   Lab Results   Component Value Date    WBC 9.7 12/08/2011    HGB 11.7 07/14/2012    HGB 11.0 (L) 04/03/2012    HCT 37.6 12/08/2011     12/08/2011     BMP: No results found for: NA, POTASSIUM, CHLORIDE, CO2, BUN, CR, GLC  COAGS: No results found for: PTT, INR, FIBR  POC:   Lab Results   Component Value Date    HCG Negative 01/03/2022     HEPATIC:   Lab Results   Component Value Date    ALBUMIN 3.5 (L) 05/15/2012    PROTTOTAL 6.3 (L) 05/15/2012    ALT 22 05/15/2012    AST 29 05/15/2012    ALKPHOS 71 05/15/2012    BILITOTAL 0.4 05/15/2012     OTHER: No results found for: PH, LACT, A1C, ANALILIA, PHOS, MAG, LIPASE, AMYLASE, TSH, T4, T3, CRP, SED    Anesthesia Plan    ASA Status:  2   NPO Status:  NPO Appropriate    Anesthesia Type: General.     - Airway: ETT   Induction: Intravenous.   Maintenance: Balanced.        Consents    Anesthesia Plan(s) and associated risks, benefits, and realistic alternatives discussed. Questions answered and patient/representative(s) expressed understanding.    - Discussed:     - Discussed with:  Patient         Postoperative Care    Pain management: IV  analgesics, Oral pain medications, Multi-modal analgesia.   PONV prophylaxis: Ondansetron (or other 5HT-3), Dexamethasone or Solumedrol     Comments:                BAM Lackey CRNA

## 2022-01-03 NOTE — OP NOTE
[facility] Gynecology Operative Note    Pre-operative diagnosis: Papanicolaou smear of cervix with low grade squamous intraepithelial lesion (LGSIL) [R87.612]  Hx of Breast cancer   Post-operative diagnosis: Same   Procedure: Laparoscopic total hysterectomy, bilateral salpingectomy  cystoscopy   Surgeon: Russell Tran MD   Assistant(s): RANDY Leonardo PA-C  A surgical assistant was required for this surgery for his experience with retraction, achievement of hemostasis, and wound closure     Anesthesia: General Endotracheal Anesthesia   Estimated blood loss: 25 ml   Total IV fluids: (See anesthesia record)  1000ml   Blood transfusion: No transfusion was given during surgery   Total urine output: (See anesthesia record)  250 ml   Drains: None   Specimens: Uterus, cervix and bilateral fallopian tubes   Findings: Multiparous uterus, normal fallopian tubes, and ovaries  Simple cyst on the left - drained straw colored fluid  Normal bladder mucosa with bilateral ureteral efflux   Complications: None   Condition: Stable   Comments: Teresa Chin   1975  [id number]      Teresa Chin  presented for the above procedure.  She has Lowgrade JOEY, is s/p mastectomy for breast cancer  I met with Teresa  and her Mother and discussed the planned procedure as well as the expected post operative course.  Risks of complications were noted and postoperative signs to watch for outlined.  Questions were answered and consent signed.  She was taken to the OR [facility]   where she was placed in the supine position. She underwent General anesthesia with endotracheal intubation.  She was then placed in the Dorso-lithotomy position in [facility].  An examination under anesthesia was performed that showed:multiparous uterus no adnexal masses    She was prepped and draped.  A timeout was held confirming her identity and proposed procedures. All were in agreement.     Speculum placed in the vagina cervix was  isolated.  This grasped with single-tooth tenaculum.  It was measured at 3.0 cm in diameter.  Uterus sounded to 9 cm was easily serially dilated #7 Hegar dilator.  A Claudine uterine manipulator with a 3 cm ring and then 8 cm obturator was then placed and secured with a balloon.  This was palpably in place.  Justin catheter was placed in the bladder to straight drainage.  This drained Pyridium stained urine.    Attention was turned the abdomen.  Each abdominal incision was preinstilled with ~0.25% Marcaine.  Umbilical incision was made the Veress needle placed with the incision.  The opening pressure was 7 mm of 3.  3 L of CO2 were insufflated the Veress needle was removed 5 mm trocar and cannula placed out difficulty.  Placement documented visually.  Two 5 mm ports were placed one in each of the lower quadrants under direct vision.  An 8 mm port was placed in the suprapubic area also under direct vision.  Findings were as noted above.    There is a cyst on the left ovary which is simple in appearance but is drained of less than 2 cc of straw-colored fluid.  This was discarded.  The cyst resolved completely.  Right distal fallopian tube was elevated and the attachment to the ovary was cauterized and transected.  Dissection carried out along the mesosalpinx to the cornu.  The ovarian ligament was crossclamped cauterized and transected.  This was attached to the round ligament with cautery on the right.  The left and was a much narrower pedicle.  Manubrial pexy was performed using attaching the left ovary to the left round ligament.  Dissection carried down through the broad ligament to the cardinal ligament the bladder flap transversely incised.  The ascending vessels were crossclamped and cauterized and subsequently transected.  A monopolar L Hook  was used to create the colpotomy.  This was done in circumferential fashion on the left anterolateral aspect was significant thickening of the tissue.  This was subsequently  found to be lateral aspect of the cervix which was subsequently removed and sent with the uterus and fallopian tubes.  Hemostasis was assured with electrocautery.  The uterus was removed vaginally and a blue nasal suction bulb placed in the vagina to maintain pneumoperitoneum.  The vaginal cuff was then closed with figure-of-eight sutures of 0 Vicryl with extracorporeal knot tying technique.    Hemostasis was obtained.  0.5% bupivacaine was instilled at the suspensory ligaments of uterus for postoperative pain control.  0.25% Marcaine was instilled intra-abdominally the same purpose.  Pneumoperitoneum was then reversed incisions were closed by Omar Leonardo PA-C after removal of the ports.  Closure was performed with 4-0 Vicryl and Dermabond.  Attention was turned to the vagina.  The blue nasal suction bulb was removed.  The vagina was explored upon to be hemostatic and well supported.  Justin catheter was then removed and cystoscopy performed.  The bladder was intact.  Bladder mucosa was normal in appearance.  Both ureteral orifices had brisk flow of urine stained with Pyridium.  This point the procedure was complete.  All instruments removed.  General counts were correct.  Patient was awakened taken to PACU in good condition.    Russell Tran MD

## 2022-01-03 NOTE — ANESTHESIA POSTPROCEDURE EVALUATION
Patient: Teresa Chin    Procedure: Procedure(s):  Laparoscopic total hysterectomy, Bilateral salpingectomy, with sparing of ovaries and cystoscopy       Diagnosis:Papanicolaou smear of cervix with low grade squamous intraepithelial lesion (LGSIL) [R87.612]  Diagnosis Additional Information: No value filed.    Anesthesia Type:  General    Note:  Disposition: Outpatient   Postop Pain Control: Uneventful            Sign Out: Well controlled pain   PONV: No   Neuro/Psych: Uneventful            Sign Out: Acceptable/Baseline neuro status   Airway/Respiratory: Uneventful            Sign Out: Acceptable/Baseline resp. status   CV/Hemodynamics: Uneventful            Sign Out: Acceptable CV status; No obvious hypovolemia; No obvious fluid overload   Other NRE: NONE   DID A NON-ROUTINE EVENT OCCUR? No           Last vitals:  Vitals Value Taken Time   BP     Temp     Pulse     Resp     SpO2         Electronically Signed By: BAM Godinez CRNA  January 3, 2022  1:54 PM

## 2022-01-03 NOTE — ANESTHESIA CARE TRANSFER NOTE
Patient: Teresa Chin    Procedure: Procedure(s):  Laparoscopic total hysterectomy, Bilateral salpingectomy, with sparing of ovaries and cystoscopy       Diagnosis: Papanicolaou smear of cervix with low grade squamous intraepithelial lesion (LGSIL) [R87.612]  Diagnosis Additional Information: No value filed.    Anesthesia Type:   General     Note:    Oropharynx: oropharynx clear of all foreign objects and spontaneously breathing  Level of Consciousness: awake  Oxygen Supplementation: face mask  Level of Supplemental Oxygen (L/min / FiO2): 10  Independent Airway: airway patency satisfactory and stable  Dentition: dentition unchanged  Vital Signs Stable: post-procedure vital signs reviewed and stable  Report to RN Given: handoff report given  Patient transferred to: Phase II    Handoff Report: Identifed the Patient, Identified the Reponsible Provider, Reviewed the pertinent medical history, Discussed the surgical course, Reviewed Intra-OP anesthesia mangement and issues during anesthesia, Set expectations for post-procedure period and Allowed opportunity for questions and acknowledgement of understanding      Vitals:  Vitals Value Taken Time   BP     Temp     Pulse     Resp     SpO2         Electronically Signed By: BAM Godinez CRNA  January 3, 2022  1:48 PM

## 2022-01-05 LAB
PATH REPORT.COMMENTS IMP SPEC: NORMAL
PATH REPORT.COMMENTS IMP SPEC: NORMAL
PATH REPORT.FINAL DX SPEC: NORMAL
PATH REPORT.GROSS SPEC: NORMAL
PATH REPORT.MICROSCOPIC SPEC OTHER STN: NORMAL
PATH REPORT.RELEVANT HX SPEC: NORMAL
PHOTO IMAGE: NORMAL

## 2022-01-05 PROCEDURE — 88307 TISSUE EXAM BY PATHOLOGIST: CPT | Mod: 26 | Performed by: PATHOLOGY

## 2022-02-09 ENCOUNTER — OFFICE VISIT (OUTPATIENT)
Dept: OBGYN | Facility: CLINIC | Age: 47
End: 2022-02-09
Payer: COMMERCIAL

## 2022-02-09 VITALS
SYSTOLIC BLOOD PRESSURE: 125 MMHG | HEART RATE: 65 BPM | WEIGHT: 179.6 LBS | DIASTOLIC BLOOD PRESSURE: 72 MMHG | BODY MASS INDEX: 30.66 KG/M2 | RESPIRATION RATE: 18 BRPM | HEIGHT: 64 IN | TEMPERATURE: 98.2 F

## 2022-02-09 DIAGNOSIS — Z90.710 S/P LAPAROSCOPIC HYSTERECTOMY: Primary | ICD-10-CM

## 2022-02-09 PROCEDURE — 99024 POSTOP FOLLOW-UP VISIT: CPT | Performed by: OBSTETRICS & GYNECOLOGY

## 2022-02-09 ASSESSMENT — MIFFLIN-ST. JEOR: SCORE: 1444.66

## 2022-02-09 NOTE — PROGRESS NOTES
"ALBERT Chin is a 46 year old female presents for post operative check. She is  6  week(s) status post Laparoscopic Total hysterectomy.  She reports doing well and denies significant pain or bleeding. There were no pathological findings.    O.  Blood pressure 125/72, pulse 65, temperature 98.2  F (36.8  C), resp. rate 18, height 1.626 m (5' 4\"), weight 81.5 kg (179 lb 9.6 oz), last menstrual period 12/13/2021.    Abd: soft, non-tender, non-distended. Incision clear, dry, and intact without evidence of infection.    A. /P.  (Z90.710) S/P laparoscopic hysterectomy  (primary encounter diagnosis)  Comment:benign findings  Plan: return to normal activities       Follow up prn or when due for next annual exam.    Russell Tran MD    "

## (undated) DEVICE — ENDO TROCAR SLEEVE KII ADV FIXATION 05X100MM CFS02

## (undated) DEVICE — SYR 50ML LL W/O NDL 309653

## (undated) DEVICE — ESU HOLSTER PLASTIC DISP E2400

## (undated) DEVICE — SUCTION IRR STRYKERFLOW II W/TIP 250-070-520

## (undated) DEVICE — DECANTER VIAL 2006S

## (undated) DEVICE — LUBRICATING JELLY 4.25OZ

## (undated) DEVICE — PAD PERI INDIV WRAP 11" 2022

## (undated) DEVICE — Device

## (undated) DEVICE — PACK LAPAROSCOPY/PELVISCOPY STD

## (undated) DEVICE — SU VICRYL 0 CT-1 36" J946H

## (undated) DEVICE — GLOVE PROTEXIS W/NEU-THERA 7.5  2D73TE75

## (undated) DEVICE — PREP SKIN SCRUB TRAY 4461A

## (undated) DEVICE — SU MONOCRYL 0 CT-1 27" Y340H

## (undated) DEVICE — DEVICE RUMI II KOH-EFFICIENT 3.0CM KC-RUMI-30

## (undated) DEVICE — UTERINE MANIPULATOR RUMI 6.7MMX8CM UMB678

## (undated) DEVICE — SYR EAR BULB 2OZ

## (undated) DEVICE — NDL INSUFFLATION 13GA 120MM C2201

## (undated) DEVICE — SYR 20ML LL W/O NDL

## (undated) DEVICE — NDL BUTTERFLY 25GA .75" 367298

## (undated) DEVICE — ESU HANDPIECE BIPOLAR THUNDERBEAT FC 5MMX35CM TB-0535FC

## (undated) DEVICE — SYR 10ML LL W/O NDL

## (undated) DEVICE — DRAPE MAYO STAND 23X54 8337

## (undated) DEVICE — ESU ENDO SCISSORS 5MM CVD 5DCS

## (undated) DEVICE — ENDO DISSECTOR BLUNT 05MM 3/PK 173019

## (undated) DEVICE — SYR 05ML LL W/O NDL

## (undated) DEVICE — CATH TRAY FOLEY SURESTEP 16FR W/URINE MTR STATLK LF A303416A

## (undated) DEVICE — DRAPE POUCH INSTRUMENT 3 POCKET 1018L

## (undated) DEVICE — SOL NACL 0.9% INJ 1000ML BAG 07983-09

## (undated) DEVICE — ENDO TROCAR OPTICAL ACCESS KII Z-THRD 08X100MM C0Q19

## (undated) DEVICE — ADH SKIN CLOSURE PREMIERPRO EXOFIN 1.0ML 3470

## (undated) DEVICE — ENDO TROCAR FIRST ENTRY KII FIOS ADV FIX 05X100MM CFF03

## (undated) DEVICE — TUBING C02 INSUFFLATION LAP FILTER HEATER 6198

## (undated) DEVICE — GLOVE PROTEXIS W/NEU-THERA 8.0  2D73TE80

## (undated) DEVICE — STOCKING SLEEVE COMPRESSION CALF MED

## (undated) DEVICE — ANTIFOG SOLUTION W/FOAM PAD 31142527

## (undated) DEVICE — SOL NACL 0.9% IRRIG 1000ML BOTTLE 07138-09

## (undated) DEVICE — SU VICRYL 4-0 FS-2 27" J422-H

## (undated) DEVICE — GOWN IMPERVIOUS SPECIALTY XLG/XLONG 32474

## (undated) DEVICE — PREP CHLORHEXIDINE 4% 4OZ (HIBICLENS) 57504

## (undated) RX ORDER — MAGNESIUM SULFATE HEPTAHYDRATE 40 MG/ML
INJECTION, SOLUTION INTRAVENOUS
Status: DISPENSED
Start: 2022-01-03

## (undated) RX ORDER — BUPIVACAINE HYDROCHLORIDE 2.5 MG/ML
INJECTION, SOLUTION INFILTRATION; PERINEURAL
Status: DISPENSED
Start: 2022-01-03

## (undated) RX ORDER — KETOROLAC TROMETHAMINE 30 MG/ML
INJECTION, SOLUTION INTRAMUSCULAR; INTRAVENOUS
Status: DISPENSED
Start: 2022-01-03

## (undated) RX ORDER — DIMENHYDRINATE 50 MG/ML
INJECTION, SOLUTION INTRAMUSCULAR; INTRAVENOUS
Status: DISPENSED
Start: 2022-01-03

## (undated) RX ORDER — OXYCODONE HCL 10 MG/1
TABLET, FILM COATED, EXTENDED RELEASE ORAL
Status: DISPENSED
Start: 2022-01-03

## (undated) RX ORDER — PHENAZOPYRIDINE HYDROCHLORIDE 200 MG/1
TABLET, FILM COATED ORAL
Status: DISPENSED
Start: 2022-01-03

## (undated) RX ORDER — FENTANYL CITRATE 50 UG/ML
INJECTION, SOLUTION INTRAMUSCULAR; INTRAVENOUS
Status: DISPENSED
Start: 2022-01-03

## (undated) RX ORDER — ACETAMINOPHEN 325 MG/1
TABLET ORAL
Status: DISPENSED
Start: 2022-01-03

## (undated) RX ORDER — DEXAMETHASONE SODIUM PHOSPHATE 4 MG/ML
INJECTION, SOLUTION INTRA-ARTICULAR; INTRALESIONAL; INTRAMUSCULAR; INTRAVENOUS; SOFT TISSUE
Status: DISPENSED
Start: 2022-01-03

## (undated) RX ORDER — ONDANSETRON 2 MG/ML
INJECTION INTRAMUSCULAR; INTRAVENOUS
Status: DISPENSED
Start: 2022-01-03

## (undated) RX ORDER — BUPIVACAINE HYDROCHLORIDE AND EPINEPHRINE 5; 5 MG/ML; UG/ML
INJECTION, SOLUTION EPIDURAL; INTRACAUDAL; PERINEURAL
Status: DISPENSED
Start: 2022-01-03

## (undated) RX ORDER — CEFAZOLIN SODIUM 2 G/100ML
INJECTION, SOLUTION INTRAVENOUS
Status: DISPENSED
Start: 2022-01-03